# Patient Record
Sex: FEMALE | Race: WHITE
[De-identification: names, ages, dates, MRNs, and addresses within clinical notes are randomized per-mention and may not be internally consistent; named-entity substitution may affect disease eponyms.]

---

## 2021-10-13 ENCOUNTER — HOSPITAL ENCOUNTER (EMERGENCY)
Dept: HOSPITAL 35 - ER | Age: 61
LOS: 1 days | Discharge: PSYCHIATRIC OR SUBSTANCE USE FACILITY | End: 2021-10-14
Payer: COMMERCIAL

## 2021-10-13 ENCOUNTER — HOSPITAL ENCOUNTER (INPATIENT)
Dept: HOSPITAL 35 - SBH | Age: 61
LOS: 7 days | Discharge: HOME | DRG: 885 | End: 2021-10-20
Attending: PSYCHIATRY & NEUROLOGY | Admitting: PSYCHIATRY & NEUROLOGY
Payer: COMMERCIAL

## 2021-10-13 VITALS — HEIGHT: 70 IN | WEIGHT: 189.99 LBS | BODY MASS INDEX: 27.2 KG/M2

## 2021-10-13 VITALS — BODY MASS INDEX: 30.76 KG/M2 | WEIGHT: 196 LBS | HEIGHT: 67 IN

## 2021-10-13 DIAGNOSIS — I10: ICD-10-CM

## 2021-10-13 DIAGNOSIS — F22: Primary | ICD-10-CM

## 2021-10-13 DIAGNOSIS — G89.29: ICD-10-CM

## 2021-10-13 DIAGNOSIS — Z28.21: ICD-10-CM

## 2021-10-13 DIAGNOSIS — M51.36: ICD-10-CM

## 2021-10-13 DIAGNOSIS — Z79.899: ICD-10-CM

## 2021-10-13 DIAGNOSIS — M54.9: ICD-10-CM

## 2021-10-13 DIAGNOSIS — F41.9: ICD-10-CM

## 2021-10-13 DIAGNOSIS — Z85.850: ICD-10-CM

## 2021-10-13 DIAGNOSIS — Z88.0: ICD-10-CM

## 2021-10-13 DIAGNOSIS — F31.2: Primary | ICD-10-CM

## 2021-10-13 DIAGNOSIS — I11.0: ICD-10-CM

## 2021-10-13 DIAGNOSIS — Z60.2: ICD-10-CM

## 2021-10-13 DIAGNOSIS — M10.9: ICD-10-CM

## 2021-10-13 DIAGNOSIS — Z20.822: ICD-10-CM

## 2021-10-13 DIAGNOSIS — Z86.19: ICD-10-CM

## 2021-10-13 DIAGNOSIS — Z88.6: ICD-10-CM

## 2021-10-13 DIAGNOSIS — Z88.8: ICD-10-CM

## 2021-10-13 DIAGNOSIS — I50.9: ICD-10-CM

## 2021-10-13 DIAGNOSIS — E03.9: ICD-10-CM

## 2021-10-13 PROCEDURE — 10880: CPT

## 2021-10-13 SDOH — SOCIAL STABILITY - SOCIAL INSECURITY: PROBLEMS RELATED TO LIVING ALONE: Z60.2

## 2021-10-14 VITALS — SYSTOLIC BLOOD PRESSURE: 153 MMHG | DIASTOLIC BLOOD PRESSURE: 87 MMHG

## 2021-10-14 VITALS — SYSTOLIC BLOOD PRESSURE: 121 MMHG | DIASTOLIC BLOOD PRESSURE: 74 MMHG

## 2021-10-14 VITALS — SYSTOLIC BLOOD PRESSURE: 103 MMHG | DIASTOLIC BLOOD PRESSURE: 68 MMHG

## 2021-10-14 VITALS — SYSTOLIC BLOOD PRESSURE: 152 MMHG | DIASTOLIC BLOOD PRESSURE: 87 MMHG

## 2021-10-14 LAB
ANION GAP SERPL CALC-SCNC: 8 MMOL/L (ref 7–16)
BUN SERPL-MCNC: 14 MG/DL (ref 7–18)
CALCIUM SERPL-MCNC: 8.5 MG/DL (ref 8.5–10.1)
CHLORIDE SERPL-SCNC: 109 MMOL/L (ref 98–107)
CHOLEST SERPL-MCNC: 140 MG/DL (ref ?–200)
CO2 SERPL-SCNC: 28 MMOL/L (ref 21–32)
CREAT SERPL-MCNC: 0.6 MG/DL (ref 0.6–1)
EST. AVERAGE GLUCOSE BLD GHB EST-MCNC: 97 MG/DL
GLUCOSE SERPL-MCNC: 96 MG/DL (ref 74–106)
GLYCOHEMOGLOBIN (HGB A1C): 5 % (ref 4.8–5.6)
HDLC SERPL-MCNC: 46 MG/DL (ref 40–?)
LDLC SERPL-MCNC: 76 MG/DL (ref ?–100)
POTASSIUM SERPL-SCNC: 4 MMOL/L (ref 3.5–5.1)
SODIUM SERPL-SCNC: 145 MMOL/L (ref 136–145)
TC:HDL: 3 RATIO
TRIGL SERPL-MCNC: 90 MG/DL (ref ?–150)
VIT B12 SERPL-MCNC: 348 PG/ML (ref 193–986)
VLDLC SERPL CALC-MCNC: 18 MG/DL (ref ?–40)
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
## 2021-10-14 NOTE — NUR
PATIENT CARE ASSUMED AT 0700, PATIENT STILL IN BED SLEEPING, GOT HER UP FOR
ASSESMENT, PATIENT IS ALERT AND ORIENTED X4, SHE REFUSED TO COME OUT FOR GROUP
OR PATICIPATE IN ANYTHING, PATIENT STATED THAT "SHE JUST WANT TO BE LEFT ALONG
TO SLEEP", SHE WAS PLEASANT WITH ASSESSMENT, LUNGS CLEAR, BOWEL SOUND ACTIVE
WITH SOFT AND ROUNDED ABDOMEN, SHE IS ABLE TO AMBULATE WITHOUT ANY DEVICE,
PATIENT HAS EDEMA ON HER LEFT LEG, SHE TOOK HER MEDICATION WHOLE, DENIES
SI/HI, VSS. WILL CONTINUE TO MONITOR PATIENT FOR SAFETY AND BEHAVIOR.

## 2021-10-14 NOTE — NUR
WOJCIECH and Dr. Raman met with the Pt.  PT unabl eto complete assessment.  Pt
presented manic and irritable.  While talking Pt stood up stating " I have to
shit".  Pt then walked out of the office door and continued to talk. Pt
finally shut the door and went to her room.

## 2021-10-14 NOTE — NUR
Admit to [facility] with psychosis, hx bipolar.  Healthy BMI 25, ate 100% of first
meal, lipids wnl.  B12 is 348 so suggest supplementation.  Otherwise low
nutrition risk
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
## 2021-10-14 NOTE — NUR
PATIENT WITHDRAWN ISOLATING IN HER ROOM. PATIENT IS STILL UPSET ABOUT HAVING
TO BE HERE. SHE IS UNCOOPERATIVE AND CHOOSES TO STAY IN HER BED. WHEN SHE DOES
CONVERSE HER SPEECH BECOMES LOUDER AS SHE TALKS. SHE DENIES PAIN OR NEEDS.
CONTINUES TO STATE THAT WE ARE EVIL FOR HAVING HER HERE. NO S/S OF DISTRESS
NOTED. WILL CONTINUE TO MONITOR.

## 2021-10-15 VITALS — SYSTOLIC BLOOD PRESSURE: 142 MMHG | DIASTOLIC BLOOD PRESSURE: 90 MMHG

## 2021-10-15 VITALS — SYSTOLIC BLOOD PRESSURE: 136 MMHG | DIASTOLIC BLOOD PRESSURE: 94 MMHG

## 2021-10-15 VITALS — SYSTOLIC BLOOD PRESSURE: 133 MMHG | DIASTOLIC BLOOD PRESSURE: 90 MMHG

## 2021-10-15 VITALS — DIASTOLIC BLOOD PRESSURE: 90 MMHG | SYSTOLIC BLOOD PRESSURE: 133 MMHG

## 2021-10-15 LAB
BILIRUB UR-MCNC: NEGATIVE MG/DL
COLOR UR: YELLOW
KETONES UR STRIP-MCNC: NEGATIVE MG/DL
RBC # UR STRIP: NEGATIVE /UL
SP GR UR STRIP: 1.02 (ref 1–1.03)
URINE CLARITY: CLEAR
URINE GLUCOSE-RANDOM*: NEGATIVE
URINE LEUKOCYTES-REFLEX: NEGATIVE
URINE NITRITE-REFLEX: NEGATIVE
URINE PROTEIN (DIPSTICK): NEGATIVE
UROBILINOGEN UR STRIP-ACNC: 0.2 E.U./DL (ref 0.2–1)
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
## 2021-10-15 NOTE — NUR
PATIENT MORE PLEASANT AND COOPERATIVE TONIGHT. STATES THAT SHE WANTS TO DO
WHAT SHE NEEDS TO SO SHE CAN GO HOME. SHE TOOK HER MEDICATIONS TONIGHT. SHE
DID COME OUT OF HER ROOM AND SIT IN THE COMMON AREA FOR A BIT TONIGHT. SHE IS
AAOX3. DENIES PAIN OR NEEDS. WILL CONTINUE TO MONITOR FOR CHANGES IN STATUS.

## 2021-10-15 NOTE — NUR
Alert and orientated X4.  Demanding zanax in AM stating she has been on it for
30 years.  When asked why she needs zanax she states she wants to be sedated
and stay in her room.  Later she stated that she needs it for physical
symptoms.  Discussed with Dr. Liu in group.  Denies SI/HI.
Breath sounds clear.  Reg HR auscultated.  Color pink with brisk capillary
refill and palpaple peripheral pulses.  Independent with voiding.  Active
bowel sounds over soft, rounded abdomen.  Last BM 10/13.  Ambulates with
regular, steady gait.
 
1415  States she would like Tylenol for lower back pain 10/10.  Participating
in group and ambulating without s/o distress.  States she has mass in lower
back "just like the one her son has in his lower back".  Tylenol given.

## 2021-10-16 VITALS — DIASTOLIC BLOOD PRESSURE: 89 MMHG | SYSTOLIC BLOOD PRESSURE: 135 MMHG

## 2021-10-16 VITALS — SYSTOLIC BLOOD PRESSURE: 165 MMHG | DIASTOLIC BLOOD PRESSURE: 99 MMHG

## 2021-10-16 VITALS — SYSTOLIC BLOOD PRESSURE: 138 MMHG | DIASTOLIC BLOOD PRESSURE: 99 MMHG

## 2021-10-16 NOTE — NUR
ASSUMED CARE ON 10/16/21 @ 1900, IN HER ROOM SECONDARY TO  COVID19 PCR (+)
RESULTS. AFEBRILE, DENIES SHORTNESS OF BREATH, EVEN WHEN AMBLATING TO TOILET,
PAIN IN HER FEET, LEGS, BACK AS WELL AS HEAD. TYLENOL PROVIDED FOR PAIN
RELIEF. COOPERATIVE WITH CARE AND ISOLATION. WILL CONTINUE TO MONITOR AS PER
[facility] UNIT PROTOCOL FOR SAFETY AND COMFORT.

## 2021-10-16 NOTE — NUR
Alert and orientated X4.  Denies SI/HI.  Wants to stay in bed this AM and
sleep but agrees to go to group.  Up ambulating without s/o distress, regular,
steady gait.  Breath sounds clear.  Reg HR auscultated.  Color pink with brisk
capillary refill and palpable peripheral pulses.  No edema.  Independent with
voiding.  Active bowel sounds over soft, rounded abdomen.  Participated in
group.

## 2021-10-17 VITALS — DIASTOLIC BLOOD PRESSURE: 93 MMHG | SYSTOLIC BLOOD PRESSURE: 142 MMHG

## 2021-10-17 VITALS — SYSTOLIC BLOOD PRESSURE: 130 MMHG | DIASTOLIC BLOOD PRESSURE: 87 MMHG

## 2021-10-17 NOTE — NUR
Michelle was alert and oriented x4 throughout the shift. She remains on contact
and droplet precautions for positive covid test yesterday, 10/16/21. Per an RN
that was here yesterday, pt was to be retested today but there was not an
order in the computer. The other RN placed the order this afternoon, therefore
pt was tested this afternoon and results have not yet resulted. Pt had c/o
10/10 anxiety (10 being the worst) and was given ativan PO PRN per pt request
at 0905 and 1655 with effectiveness. Pt also voiced c/o generalized pain
(worse in her legs and bag), that was relieved with tylenol PRN. Pt denied
feelings of depression and denied SI/HI/HA. Pt denied symptoms of COVID but
did appear fatigued and pt stated "I need to rest, it's been a while since
I've rested". Pt was medication and meal compliant, and had a good appetite.
Will continue to monitor.

## 2021-10-18 VITALS — SYSTOLIC BLOOD PRESSURE: 112 MMHG | DIASTOLIC BLOOD PRESSURE: 85 MMHG

## 2021-10-18 VITALS — DIASTOLIC BLOOD PRESSURE: 70 MMHG | SYSTOLIC BLOOD PRESSURE: 112 MMHG

## 2021-10-18 VITALS — SYSTOLIC BLOOD PRESSURE: 89 MMHG | DIASTOLIC BLOOD PRESSURE: 62 MMHG

## 2021-10-18 NOTE — EKG
[address]  [address]
Phone:  [phone]                    ELECTROCARDIOGRAM REPORT      
_______________________________________________________________________________
 
Name:       ABBY MEMBRENO            Room #:         52-      ADM IN  
M.R.#:      [id number]     Account #:      [id number]  
Admission:  10/13/21    Attend Phys:    Jayce Liu DO
Discharge:              Date of Birth:  [birth year]  
                                                          Report #: [id number]
   [id number]
_______________________________________________________________________________
                          [facility]
                                       
Test Date:    2021-10-15               Test Time:    16:35:30
Pat Name:     ABBY MEMBRENO         Department:   
Patient ID:   [id number]            Room:         [facility]
Gender:       F                        Technician:   SEBASTIAN
:          1960               Requested By: Jayce Liu
Order Number: [id number] MD:   Tomi Noble
                                 Measurements
Intervals                              Axis          
Rate:         82                       P:            45
VA:           154                      QRS:          30
QRSD:         82                       T:            38
QT:           374                                    
QTc:          437                                    
                           Interpretive Statements
Sinus rhythm
Abnormal R-wave progression, early transition
Baseline wander in lead(s) V4
No previous ECG available for comparison
Electronically Signed On 10- 7:27:07 CDT by Tomi Noble
[email or web address]
 
 
 
 
 
 
 
 
 
 
 
 
 
 
 
 
 
 
 
 
 
  <ELECTRONICALLY SIGNED>
   By: Tomi Noble MD, [facility]    
  10/18/21     0727
D: 10/15/21 1635                           _____________________________________
T: 10/15/21 1635                           Tomi Noble MD, FACC      /EPI

## 2021-10-18 NOTE — NUR
Pt was alert and oriented x4 throughout the day. She started the shift off on
contact/droplet precautions due to a positive COVID test on 10/16. Pt's
isolation precautions were D/C'd this morning after a negative test yesterday
afternoon. Pt's test from this morning also came back negative. Pt was
isolated to her room throughout the day until this arfternoon around 1450, pt
came to the nurses station asking for immodium and ativan PRN. Pt stated that
she has had diarrhea "since I got here, because of my IBS". Order received per
Dr. Liu for immodium PRN. Pt initially denied symptoms of anxiety and
pain this morning but at administration of tylenol and ativan pt rated her
pain 10/10 and anxiety 10/10; 10 being the worst. Pt was encouraged at that
time to stay out of her room and participate in group, which pt did go ahead
and join group. Pt was medication and meal compliant, taking pills whole
without difficulty. She did not c/o other physical symptoms besides pain this
shift and did not appear in distress throughout the day. She denied SI/HI/HA
and denied feelings of paranoia. Will continue to monitor.

## 2021-10-18 NOTE — NUR
PATIENT IS IN ISOLATION D/T A POSITIVE COVID PCR FROM 10/16/21 WHICH ALSO
NOTED A NEGATIVE RESULT AT SAME TIME A DATE AS POSITIVE. COVID PCR RAN TODAY
WAS NEGATIVE. WILL RUN ANOTHER ONE THIS MORNING TO RULE OUT BEFORE STOPPING
ISOLATION. PATIENT HAS BEEN IN ROOM ALL NIGHT. SHE IS A/0X4. SHE IS ANXIOUS
AND RESTLESS AND WAS REQUESTING ATIVAN AND TYLENOL BEFORE IT WAS DUE. SHE
STATES SHE NEEDS HELP WITH SLEEP AND WAS USED TO TAKING XANAX AT LEAST TID AT
HOME. CALLED AND RECIEVED ORDER FROM DR BAZAN  TO GO AHEAD AND GIVE ATIVAN
1MG PO ONETIME EVEN THOUGH SHE HAD .5MG GIVEN TO HER 5HOURS EARLIER AND SHE
WAS TO WAIT 8 HOURS. PATIENT STATES SHE HAS TREMORS AND RESTLESS LEG AND BACK
ACHES. TOO EARLY TO GIVE TYLENOL SO JUST HAD TO MAKE COMFORTABLE. PATIENT WAS
ABLE TO RELAX AND FALL BACK TO SLEEP AFTER TAKING THE ATIVAN 1MG. PT TOOK MEDS
WHOLE WITH WATER. SHE DENIES SI/HI/AVH. ROUTINE ROUNDS WITH CONTACT ISOLATION
RULES IN AFFECT. CONTINUING TO MONITOR.

## 2021-10-19 VITALS — DIASTOLIC BLOOD PRESSURE: 88 MMHG | SYSTOLIC BLOOD PRESSURE: 136 MMHG

## 2021-10-19 VITALS — SYSTOLIC BLOOD PRESSURE: 137 MMHG | DIASTOLIC BLOOD PRESSURE: 87 MMHG

## 2021-10-19 VITALS — DIASTOLIC BLOOD PRESSURE: 87 MMHG | SYSTOLIC BLOOD PRESSURE: 137 MMHG
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
## 2021-10-19 NOTE — NUR
WOJCIECH contacted Pt's son, Solomon, concerning discharge.  WOJCIECH informed that the Pt
would need to follow up with [facility] and her PCP.  Solomon expressed concerns
that the Pt may not still be well.  Also the Pt knows what to say to leave the
hospital.  WOJCIECH educated Solomon about mental illness and medication management.
WOJCIECH stressed the importance of follow up services for the Pt.  Solomon stated he
would be able to pick the Pt up at 1300 on 10/20/2021.
 
Pt will need to walk into [facility] anytime M-F 8:30am -1:30pm.  Pt has a
PCP appointment at [facility] on 10/25/2021 @ 11:20 am.  This
information was told to the Pt verbally and put in the discharge documents.

## 2021-10-19 NOTE — NUR
10/19/2021 WOJCIECH and Dr. Raman met with the Pt concerning discharge.  Pt
stated she would be going to live with her son.  Pt reported she got put out
of her home.  Pt stated she was in agreement to participating in otpt mental
health services.  WOJCIECH informed Pt about [facility] and the
services provided.  Dr. Raman informed Pt about need to follow up
with her PCP concerning the xanax perscription.  Pt report seeing Dr. Benny Ricardo at [facility].  Pt has no other questions or concerns at this time

## 2021-10-19 NOTE — NUR
At onset of night shift pt was resting in bed awake. This shift pt was alert
and oriented to self and date. Pt stated she was in [place] and knows she
is near [facility]. Pt's insight into situation is limited. Pt was compliant with
medication and vital signs. Pt was overall calm, pleasant and cooperative. Pt
complained of anxiety and requested Ativan but it was too soon to administer.
Pt talked about how she is leaving tomorrow and is excited. Pt stated she gets
her money tomorrow and is going to buy snacks and cigarettes.

## 2021-10-19 NOTE — NUR
Assumed care from overnight shift this am. Client was in room resting on bed.
Client stated that she was ready to leave facility, and stated that she was
going to remain in bed all day despite being off of isolation precautions.
Client was encouraged to go to groups and interact with staff, but declined,
stating that she disliked people, and preferred to stay in her room. Client
stated that she didn't feel the need to attend groups as she would be leaving
tomorrow. Client stated that she had a headache, and neck pain, and stated
that this was another reason she didn't want to go to groups. Client denied
any depression and stated that she did have anxiety at this time, requesting
her prn ativan for her anxiety, stating that this was the only thing helping.
When asked about hallucinations, client stated that she heard audio
hallucinations, but did not see visual. Client presented A&O x 4 during this
time. Lung sounds present with slight distortion but no shortness of breat
noted. Bowel sounds normal. No concerns presented by client at this time. Will
continue to monitor for pt safety and concerns.

## 2021-10-20 VITALS — SYSTOLIC BLOOD PRESSURE: 96 MMHG | DIASTOLIC BLOOD PRESSURE: 65 MMHG

## 2021-10-20 VITALS — DIASTOLIC BLOOD PRESSURE: 65 MMHG | SYSTOLIC BLOOD PRESSURE: 96 MMHG

## 2021-10-20 NOTE — NUR
PATIENT HAS BEEN ISOLATING SELF TO ROOM, MEALS OFFERED IN ROOM. MEDICATIONS
GIVEN AT BEDSIDE, WELL TOLERATED. LCTA, RESP EVEN/UNLABORED, NO SOA/CYANOSIS
NOTED, BS+X4, ABD SOFT, NON-TENDER TO TOUCH. PATIENT DENIES SUICIDAL/HOMICIDAL
IDEATION, SHE DENIES DEPRESSION/ANXIETY. PRN TYLENOL 650MG GIVEN AT 1152HRS
FOR GRICELDA LEG PAIN OF 8/10. WHEN REASSESSD, PAIN DECREASED TO 0/10. AFFECT IS
FLAT, CALM, POOR EYE CONTACT. MOOD IS EUTHYMIC. AFFECT IMPROVED AFTER PRN
ATIVAN GIVEN TO HER PER HER AT 1429HRS PER HER REQUEST. PATIENT IS DISCHARGING
HOME TODAY, DISCHARGE INSTRUCTIONS, AND MEDICATION LIST REVIEWED WITH PATIENT,
SHE VERBALIZES UNDERSTANDING, AND SIGNED ALL DISCHARGE PAPERWORK. PATIENT
DECLINES GROUP THERAPY, NO SIGN OF ACUTE DISTRESS NOTED, WILL MONITOR FOR
SAFETY.

## 2021-10-21 NOTE — D
[facility]
Surinder Rollins
[place]   [address]                     DISCHARGE SUMMARY             
_______________________________________________________________________________
 
Name:       ABBY MEMBRENO            Room #:         521A-A      [facility] [place]  
..#:      [id number]                       Account #:      [id number]  
Admission:  10/13/21    Attend Phys:    Jayce Liu DO
Discharge:  10/20/21    Date of Birth:  [birth year]  
                                                          Report #: [id number]
                                                                    [id number] 
_______________________________________________________________________________
THIS REPORT FOR:  
 
cc:  ROSE GOODSON                 
     Physician not on staff                                               
     Jayce Liu DO                                        ~
DATE OF SERVICE: 10/20/2021
 
INPATIENT PSYCHIATRIC DISCHARGE SUMMARY
 
ATTENDING PSYCHIATRIST:  Jayce Liu DO
 
MEDICAL CONSULTANT:  Fan Dickson MD.  Please note that the patient was an 
involuntary admission during the hospitalization.
 
DISCHARGE DIAGNOSIS:  Bipolar 1 disorder, most recent episode, manic, severe 
without psychotic features, improved.
 
MEDICAL COMORBIDITIES:  Include hypertension; history of CHF, no current 
exacerbation; history of thyroid cancer, status post partial thyroidectomy; 
gout; chronic back pain; and degenerative disk disease.  The patient is being 
discharged to her son's home to live with her son.
 
AFTERCARE:  Is as follows.  [facility] intake, Monday 
through Friday at 8:30 p.m. to 1:30 p.m.  She has a PCP appointment [facility] 
[place] on 10/25/2021 at 11:20 a.m.
 
DISCHARGE MEDICATIONS:  Nicotine patch 1 patch for tobaccoism, lisinopril 20 mg 
a day for hypertension, Haldol 5 mg oral at 0900 and 2100 for mood 
stabilization, famotidine 20 mg oral daily for GERD, and B12 500 mcg oral daily 
for supplementation.
 
DISCHARGE DIET:  Regular diet.
 
ACTIVITY LEVEL:  As tolerated.  No alcohol.  No illicit drugs.
 
The patient was given crisis suicide hotline information.  The patient was given
information on the [place] QuitLine for tobacco cessation.
 
Recommend the patient to see a dentist as well as she has poor dentition.
 
REASON FOR ADMISSION:  Back on the 13th, a 61-year-old female, sent to us by 
[facility].  Affidavits were done by phone, .  Evidently, the
patient threatened the , the patient was [place] acting 
bizarrely, talking about hospital staff to give her Lasic and CABRERA to "blow her
up".
 
 
 
 
[address]   [address]                     DISCHARGE SUMMARY             
_______________________________________________________________________________
 
Name:       ABBY MEMBRENO            Room #:         521A-A      [facility] [place]  
[id number]                       Account #:      [id number]  
Admission:  10/13/21    Attend Phys:    Jayce Liu DO
Discharge:  10/20/21    Date of Birth:  [birth year]  
                                                          Report #: [id number]
                                                                    [id number] 
_______________________________________________________________________________
HOSPITAL COURSE:  The patient was admitted to the Geriatric Psychiatry Unit, and
initially, the patient was uncooperative, frankly manic.  The patient did comply
with taking oral antipsychotic medication, and we saw rapid improvement from her
grossly disorganized distraught state.  The patient was not as cooperative with 
either signing in voluntarily or really having great participation and 
enthusiasm.  The patient had good medication compliance, though we had a 
telephonic family meeting with her son during the admission. The limitations of 
the psychiatric admission were discussed with the son who was willing to provide
housing.  The patient will need [facility] 
Services.  On the day of discharge, the patient was not suicidal or homicidal.  
During the course of the admission, she did have a false positive COVID PCR 
test, so she did have a day or two of isolation due to that, but she was deemed 
not to be positive.
 
PHYSICAL EXAMINATION:
VITAL SIGNS:  Temperature 36.3, pulse 90, respirations 18, blood pressure of 
96/65, and O2 sat 95%.
MUSCULOSKELETAL:  Normal gait and station.
 
MENTAL STATUS EXAMINATION:  This is a well-developed, unkempt  female, 
appearing older than stated age.  Attention fair.  Concentration fair.  Speech, 
normal in rate.  Thought process:  Linear and goal directed.  Thought content 
focused on discharge.  Denied SI, HI.  Denies auditory or visual type 
hallucinations. denied hopelessness, denied helplessness
Mood and affect was okay, congruent, fairly euthymic.  Insight
and judgment were fair to limited.  Fund of knowledge, not greater than average.
 
Prognosis for this patient is guarded given the history of noncompliance with 
psychiatric care.
 
 
 
 
 
 
 
 
 
 
 
 
 
 
 
  <ELECTRONICALLY SIGNED>
   By: Jayce Liu DO        
  10/21/21     2144
D: 10/20/21 2030                           _____________________________________
T: 10/20/21 2226                           Jayce Liu DO          /nt

## 2021-11-03 NOTE — NUR
RECEIVED REPORT FROM ED RN ZAFAR. 11-2-21 PT ARRIVED ON UNIT 2335. PT AAOX4,
B/P 141/101, P 107, R 18, T 98.1, 02 SAT 96% RA RR EVEN AND NONLABORED. PT
PRESENTED UNTIDY, HYPERVERBAL WITH DISORGANIZED THOUGHTS; PT HAS BEEN
COOPERATIVE THROUGHOUT NURSING ASSESSMENT, PT REFUSING FULL SKIN ASSESSMENT.
PT HAS HX CHF, HTN, ALLGERIC RHINITIS, BLADDER SPASMS, CHRONIC BACK PAIN,
CHRONIC USE OF OPIOIDS, CLASS 1 OBSIETY, IBS, OA HIP, ANXIETY/DEPRESSION,
BIPOLAR WITH PSYCHOTIC FEATURES. HCP JOHNNIE OWENS NP FOR CONSULT. HCP ARCHANA PIMENTEL NP CONSULTED, ORDERS RECEIVED AND ADMIN. PT WILL CONTINUE TO BE MONITOR PER
[facility] PROTOCOL.

## 2021-11-03 NOTE — NUR
WOJCIECH and Dr. Liu attempted to meet with the Pt.  Pt appeared upset and
refusing to talk.  Pt got in her bed and pulled the cover over her face.  WOJCIECH
and Dr. Liu left the Pt's room

## 2021-11-03 NOTE — NUR
Nutrition: Pt admitted to [facility] with bipolar dx and psychotic features. Was here
recently and ate well that admit. New admit, no records yet. Stable weights
recently. Cholesterol 200, . On Heart healthy diet. Noted B12 348
borderline low last month. Consider B12 supplementation. Consider low
nutrition risk, follow intake trends.

## 2021-11-03 NOTE — NUR
PATIENT WAS IN BED ASLEEP WHEN CARE ASSUMED. MORNING MEDICATION GIVEN AT BED
SIDE, WELL TOLERATED. BREAKFAST SERVED IN ROOM AS PATIENT CHOOSE NOT TO GET UP
FOR BREAKFAST, STATES SHE IS VERY TIRED. PATIENT DID GET UP IN A W/C FOR
LUNCH, CONSUMED 100%, REQUESTED TO GO BACK TO BED, DR. BAZAN GAVE
PERMISSION TO LET HER GO BACK TO BED FOR TODAY, SHE IS HAS ROOM LOCK-OUT
ORDER.  PATIENT DENIES SUICIDAL/HOMICIDAL IDEATION, RATES GRICELDA LEG PAIN 10/10,
PRN TYLENOL GIVEN WITH POSITIVE EFFECT, PAIN DECREASED TO 2/10 UPON
REASSESSMENT. AFFECT IS LABILE, MOOD IS EUTHYMIC.  PICTURE TAKEN, AND PLACED
IN THE CHART. NO AGITATION OR AGGRESSIVE BEHAVIOR NOTED AT THIS TIME. PATIENT
CURRENTLY IN BED RESTING, WILL MONITOR FOR SAFETY.

## 2021-11-04 NOTE — NUR
11-03-21 CARE TRANSFERRED 1900 OBSERVED PT WALKING IN HALLWAY. LATER PT AAOX4,
VSS, RR EVEN AND NONLABORED ON RA. PT DENIES SI/HI. PT REPORTS HAVING PAIN IN
BLE AND REQUEST SOMETHING FOR PAIN, PT WAS EDUCATION THAT TYLENOL WAS GIVEN
ACOUPLE OF HOURS EARLIER, PT ACKNOWLEDGE THIS. PT REMAINED CALM AND
COOPERATIVE. DURING MEDICATION ADMIN PT HAD NO DIFFICULTIES, BUT BECAME
SARCASTIC AND IRRITABLE OVER NOTING GETTING HER XANAX. PT HAS APPROACHED
THIS WRITER SEVERAL OCCASIONS AT DIFFERENT ANGLES WANTING MEDICATION. PT
REPORTED SHE CAN FEEL HER BLOOD PRESSURE IS HIGH, NATALIA B/P 128/88, L. ARM
SITTING, HT RR, SKIN W/D, NO S/S OF CARDIC OR WITHDRAWAL NOTED. PT BED WAS
ADJUSTED FOR COMFORT. PT WILL CONTINUE TO BE MONITOR PER [facility] PROTOCOL.

## 2021-11-04 NOTE — NUR
Assumed care on 11/04/21 @ 1900, in room in bed Awake, alert oriented x4 with
confusion and flight of ideas noted. Talks about weakness and falls on both
hips. Observed ambulating to the toilet, however states that she cannot go to
the bathroom because she cannot walk. Encouraged to use a walker to ambulate,
which she was willing to do once propted. VSS, HRRR, Lungs CTA bilat, ABD N x
4Q, reports she is hungry and did ot eat lunch, but did have a good dinner,
enocuraged to go to the day room for snacks and she did. Tylenol provided for
hip pain 6/10.
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
## 2021-11-05 NOTE — NUR
At onset of night shift pt was sitting in her room awake. This shift pt was
alert and oriented x3. Pt does not appear to be oriented to situation. Pt is
able to walk out of her room independently but made comments about her legs
not working correctly and not being able to walk. Pt made a comment stating
she needs xanax. Pt endorsed anxiety. Pt denied SI and HI. Pt stated her son
is taking care of her apartment and belongings while she is here. Pt stated
she can leave when she is ready and return to her apartment. Pt's speech was
tangential and hyperverbal. Pt was compliant with vital signs and medications.
Pt was not observed socializing with peers, but did comment that she wants to
tell her peers what they need to do to be discharged. RN encouraged pt to
focus on her own care plan and not interfer with peers. Pt's affect was broad
and pt smiled while speaking with RN. Will continue to monitor.
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
## 2021-11-06 NOTE — NUR
At onset of shift pt was sitting in her room awake. This shift pt stated she
felt tired and wanted to sleep. Pt stated she had a good day and "slept and
ate." Pt stated she is going to leave on Monday and is looking forward to
discharging. Pt stated she feels like she is in [facility]. Pt denied SI, HI and
AVH. Pt was compliant with medication and vital signs.

## 2021-11-06 NOTE — NUR
Assumed care at 0700, patient in bed sleeping, got up for breakfast, alert and
oriented x3, patient complained of not getting enough sleep and will like to
skip morning group, patient encouraged to attend group and remainded of being
in room lock out, assessment completed, active bowel sound with soft abdomen
skin intact, no edema noted, clear lungs, patient is calm, pleasant
and cooperative with care, took medication whole, denies SI/HI. patient is
able to verbalize needs. will continue to monitor for safety.

## 2021-11-07 NOTE — NUR
CARE ASSUMED aT 0700, PATINET IN BED AWAKE, ALERT AND ORIWNTED X3, CALM AND
PLEASANT WITH CARE, ATE BREAKAST AND TOOK MEDICATION WHOLE, ABLE TO VERBALIZE
NEEDS, BREATH SOUND CLEAR, ACTIVE BOWEL SOUND WITH SOFT ABDOMEN, SKIN INTACT,
NO EDEMA NOTED, PATIENT SAID "SHE IS LOOKING FORWARD TO BEING DISCHARGE
TOMORROW", PATIENT DENIES SI/HI, NO BEHAVIOR CHANGE, WILL CONTINUE TO MONITOR
PATIENT FOR SAFETY.

## 2021-11-08 NOTE — NUR
11---Attempted call to number provided by [facility] and I got a recoreding of
documents they wanted faxed.  I left a message that a bed needed to be held
for a patient who is DC'ing at 1300.  Attempted all day to fax information
with no response.
11---0730--Attempted call to RN at [facility] again this AM.
Also attempted to fax information needed for patient to have a bed tonight.
Fax stated "No Response" again.  Left a message for the RN with the time of
discharge and asking for a bed to be saved for patient tonight.  Will attempt
call and fax after 8:00 AM.

## 2021-11-08 NOTE — NUR
Assumed pt care at 0700. Pt was alert and oriented x4. Assessments completed,
vss. Lungs clear, active bowel sound. Denies si/hi, Denies pain. Took meds
whole, no difficulty noted, ambulates with a steady gait. No sign of acute
distress noted upon assessments. Calm and cooperative with care. 1408 pt was
d/c to shelter with her purple hand bag and belongings. Pt was transported by
[facility]. Pt Prescription was picked up at the out patient Pharmacy. Pt was d/c
with d/c instrution packet.

## 2021-11-08 NOTE — NUR
PATIENT CARE WAS RESUMED AT 1900. SHE IS ALERT AND OREINTED. ABLE TO VERABLISE
SOME NEEDS. LUNGS ARE CLEAR BS ACTIVE X4 QUADS. SHE AMBULATES AND TOOK HER
MEDS    WHOLE. SHE IS ABLE TO VERBALIZE HER NEEDS. SHE DENIES SI/AVH/HI. SHE
IS CONTINENT OF BOWEL AND BLADDER. LUNGS ARE CLEAR BS ACTIVE X4 QUADS. BED IS
LOW AND LOCKED

## 2021-11-08 NOTE — NUR
11---0810--Call returned from Cira CRUZ at [facility].  She
states the women's facility is run differently.  She stated after she reviewed
the fax and cleared her she would call me back with an address.

## 2021-11-08 NOTE — NUR
11---1015--Call returned by Cira (RN) from the [facility].
She stated patient was medically cleared to go to the [facility] if
they have a bed.  Number given for [facility] ([phone]) and the
address is [address] [place] [address].  I called the hotline to reserve the
bed. They had only one bed left and they are reserving it for her. Worker
stated they do the intakes between 1 and 3 and if she was going to be late she
should call them and let them know or they will give the bed to someone else.
I advised them I would give her the instructions.

## 2021-11-09 NOTE — D
[facility]
[address]
[place]   [address]                     DISCHARGE SUMMARY             
_______________________________________________________________________________
 
Name:       ABBY MEMBRENO            Room #:         [address]B      [facility] [place]  
M.R.#:      [id number]                       Account #:      [id number]  
Admission:  11/02/21    Attend Phys:    Jayce Liu DO
Discharge:  11/08/21    Date of Birth:  [birth year]  
                                                          Report #: [id number]
                                                                    [id number] 
_______________________________________________________________________________
THIS REPORT FOR:  
 
cc:  FAM - Family physician unknown
     FAM - Family physician unknown                                       
     Jayce Liu DO                                        ~
DATE OF SERVICE: 11/08/2021
 
INPATIENT PSYCHIATRIC DISCHARGE SUMMARY
 
ATTENDING PSYCHIATRIST:  Jayce Liu DO
 
MEDICAL CONSULTANT:  Vishal Malik M.D.
 
DISCHARGE DIAGNOSES:  Bipolar 1 disorder, most recent episode manic with 
psychotic features, improved.
 
ADDITIONAL DIAGNOSES:  Substance use disorder for benzodiazepines and opiates at
least moderate degree.  Tobacco dependence.  Medical comorbidities include mild 
obesity, hypertension, hyperlipidemia, vitamin D, folate deficiency, history of 
thyroid cancer, status post partial thyroidectomy, gout, chronic back pain, 
degenerative disk disease.  The patient is discharging to [facility] in [place], [place].
 
AFTERCARE:  Is as follows.  I believe her goal is to reestablish mental health 
care with [facility], but while she is living in downtown [place], [place],
probably, [place] is better place to establish.  The patient is encouraged to see
her primary care physician within 1 month.
 
DISCHARGE MEDICATIONS:  Include famotidine 20 mg oral daily for GERD, vitamin 
B12 500 mcg oral daily for supplementation.  Scripts were given 30 days for 
atorvastatin 20 mg oral at bedtime for hyperlipidemia, lisinopril 20 mg oral 
daily for hypertension, olanzapine 15 mg oral at bedtime for mood stabilization,
olanzapine 5 mg oral at 0900 for mood stabilization, docusate 100 mg oral twice 
daily, hold if diarrhea and bowel motility, oxybutynin 5 mg oral twice daily for
overactive bladder, folate 1 mg oral daily for supplementation.  Smoking 
cessation is encouraged.  Nicoderm prescription to pharmacy.  No alcohol, no 
illicit drugs.  Crisis suicide hotline information was given to the patient.
 
LABORATORY DATA:  Significant laboratories on this admission were COVID-19 
serology not detected on 11/07/2021 as well as 11/05/2021 and 11/02/2021 by PCR.
 
REASON FOR ADMISSION:  Back on 11/02/2021, a 61 year old female who was 
discharged from [facility] in October.  She was transferred 
from [facility] voluntarily after admission for altered mental 
status and psychosis.
 
 
 
 
[facility]
[address] [place]   [address]                     DISCHARGE SUMMARY             
_______________________________________________________________________________
 
Name:       ABBY MEMBRENO            Room #:         519B-B      [facility] [place]  
[facility]#:      [id number]                       Account #:      [id number]  
Admission:  11/02/21    Attend Phys:    Jayce Liu, 
Discharge:  11/08/21    Date of Birth:  [birth year]  
                                                          Report #: [id number]
                                                                    [id number] 
_______________________________________________________________________________
HOSPITAL COURSE:  The patient was admitted to Geriatric Psychiatry Unit.  It 
become clear that the patient had quickly relapsed
and used multiple substances following
her previous discharge from this unit.  Continue the patient on olanzapine.  The
patient wanted to stay with her son and her son was not responsive when we 
contacted during this admission. Ideally, The patient would 
best benefit from residential treatment for chemical dependency; however, the 
patient still is what I would call in precontemplation phase, risk of harm, 
overdose, and death she was counseled and
continues to use multiple substances. I discussed her
harman relatively improved
last admission and reappeared earlier on in this admission
  She feigned being
disabled, not being able to move, and in extreme pain during this admission
.  The patient is able to
ambulate randomly at other times, so this was believed to be fabricated.
 
PHYSICAL EXAMINATION:
VITAL SIGNS:  On day of discharge, temperature 36.6, pulse 81, respirations 18, 
/63.
MUSCULOSKELETAL:  Normal gait and station.
 
MENTAL STATUS EXAMINATION:  Well-developed, unkempt  female, poor 
dentition, intention fair.  Concentration limited.  Speech:  Increased rate.  
Thought process:  Linear and goal directed.  Thought content, focused on being 
upset at her son who was not picking her up.  Denied SI, HI.  Denied auditory or
visual type hallucinations.  Mood and affect was congruent, irritable.  
Memory not formally tested.  Insight and judgment limited.  Fund of knowledge 
below average.
 
PROGNOSIS:  For this patient is guarded given homelessness, poor compliance, 
prone to relapse, poor social support.
 
ACTIVITY LEVEL:  As tolerated.  Advised against driving.
 
DIET:  Regular.
 
no Alcoho, no Benzodiazepines, No smoking 
 
 
 
 
 
 
  <ELECTRONICALLY SIGNED>
   By: Jayce Liu DO        
  11/09/21     [id number]
D: 11/08/21 1849                           _____________________________________
T: 11/08/21 1956                           Jayce Liu DO          /nt